# Patient Record
Sex: MALE | Race: WHITE | NOT HISPANIC OR LATINO | Employment: FULL TIME | URBAN - METROPOLITAN AREA
[De-identification: names, ages, dates, MRNs, and addresses within clinical notes are randomized per-mention and may not be internally consistent; named-entity substitution may affect disease eponyms.]

---

## 2020-01-04 ENCOUNTER — HOSPITAL ENCOUNTER (EMERGENCY)
Facility: HOSPITAL | Age: 45
Discharge: HOME | End: 2020-01-04
Attending: EMERGENCY MEDICINE
Payer: MEDICAID

## 2020-01-04 VITALS
HEIGHT: 74 IN | HEART RATE: 63 BPM | DIASTOLIC BLOOD PRESSURE: 84 MMHG | TEMPERATURE: 98 F | RESPIRATION RATE: 18 BRPM | OXYGEN SATURATION: 99 % | SYSTOLIC BLOOD PRESSURE: 132 MMHG

## 2020-01-04 DIAGNOSIS — R06.2 WHEEZE: ICD-10-CM

## 2020-01-04 DIAGNOSIS — Z20.828 EXPOSURE TO INFLUENZA: ICD-10-CM

## 2020-01-04 DIAGNOSIS — J06.9 UPPER RESPIRATORY TRACT INFECTION, UNSPECIFIED TYPE: Primary | ICD-10-CM

## 2020-01-04 PROCEDURE — 96372 THER/PROPH/DIAG INJ SC/IM: CPT | Mod: ER

## 2020-01-04 PROCEDURE — 99284 EMERGENCY DEPT VISIT MOD MDM: CPT | Mod: 25,ER

## 2020-01-04 PROCEDURE — 63600175 PHARM REV CODE 636 W HCPCS: Mod: ER | Performed by: NURSE PRACTITIONER

## 2020-01-04 PROCEDURE — 25000242 PHARM REV CODE 250 ALT 637 W/ HCPCS: Mod: ER | Performed by: NURSE PRACTITIONER

## 2020-01-04 PROCEDURE — 94640 AIRWAY INHALATION TREATMENT: CPT | Mod: ER

## 2020-01-04 RX ORDER — PREDNISONE 20 MG/1
40 TABLET ORAL DAILY
Qty: 8 TABLET | Refills: 0 | Status: SHIPPED | OUTPATIENT
Start: 2020-01-05 | End: 2020-01-09

## 2020-01-04 RX ORDER — METHYLPREDNISOLONE SOD SUCC 125 MG
125 VIAL (EA) INJECTION
Status: COMPLETED | OUTPATIENT
Start: 2020-01-04 | End: 2020-01-04

## 2020-01-04 RX ORDER — PROMETHAZINE HYDROCHLORIDE AND DEXTROMETHORPHAN HYDROBROMIDE 6.25; 15 MG/5ML; MG/5ML
5 SYRUP ORAL EVERY 6 HOURS PRN
Qty: 60 ML | Refills: 0 | Status: SHIPPED | OUTPATIENT
Start: 2020-01-04

## 2020-01-04 RX ORDER — IPRATROPIUM BROMIDE AND ALBUTEROL SULFATE 2.5; .5 MG/3ML; MG/3ML
3 SOLUTION RESPIRATORY (INHALATION)
Status: COMPLETED | OUTPATIENT
Start: 2020-01-04 | End: 2020-01-04

## 2020-01-04 RX ORDER — OSELTAMIVIR PHOSPHATE 75 MG/1
75 CAPSULE ORAL 2 TIMES DAILY
Qty: 10 CAPSULE | Refills: 0 | Status: SHIPPED | OUTPATIENT
Start: 2020-01-04 | End: 2020-01-09

## 2020-01-04 RX ADMIN — IPRATROPIUM BROMIDE AND ALBUTEROL SULFATE 3 ML: .5; 3 SOLUTION RESPIRATORY (INHALATION) at 09:01

## 2020-01-04 RX ADMIN — METHYLPREDNISOLONE SODIUM SUCCINATE 125 MG: 125 INJECTION, POWDER, FOR SOLUTION INTRAMUSCULAR; INTRAVENOUS at 09:01

## 2020-01-05 NOTE — ED PROVIDER NOTES
Encounter Date: 1/4/2020       History     Chief Complaint   Patient presents with    Generalized Body Aches     pt c/o body aches, cough and fever x 3 days, pt states wife and baby have been dx with flu     The history is provided by the patient. No  was used.   URI   The primary symptoms include fever, cough and myalgias. Primary symptoms do not include headaches, sore throat, wheezing, abdominal pain, nausea or vomiting. Primary symptoms comment: congestion. The current episode started today. This is a new problem. The fever began several days ago. The fever has been unchanged since its onset.   The cough is non-productive.   The myalgias are generalized. The myalgias are not associated with weakness.   Symptoms associated with the illness include sinus pressure and congestion. The illness is not associated with chills or rhinorrhea. The following treatments were addressed: Acetaminophen was not tried. A decongestant was not tried. Aspirin was not tried. NSAIDs were not tried.     Review of patient's allergies indicates:   Allergen Reactions    Dilantin [phenytoin sodium extended]      History reviewed. No pertinent past medical history.  History reviewed. No pertinent surgical history.  History reviewed. No pertinent family history.  Social History     Tobacco Use    Smoking status: Never Smoker   Substance Use Topics    Alcohol use: Never     Frequency: Never    Drug use: Never     Review of Systems   Constitutional: Positive for fever. Negative for activity change, chills and diaphoresis.   HENT: Positive for congestion, postnasal drip and sinus pressure. Negative for nosebleeds, rhinorrhea, sinus pain, sore throat and trouble swallowing.    Eyes: Negative.  Negative for pain, discharge, redness and itching.   Respiratory: Positive for cough. Negative for chest tightness, shortness of breath, wheezing and stridor.    Cardiovascular: Negative.  Negative for chest pain, palpitations and  leg swelling.   Gastrointestinal: Negative.  Negative for abdominal pain, constipation, diarrhea, nausea and vomiting.   Endocrine: Negative.  Negative for cold intolerance, heat intolerance, polydipsia, polyphagia and polyuria.   Genitourinary: Negative.  Negative for difficulty urinating, discharge, dysuria, frequency, penile pain, scrotal swelling and testicular pain.   Musculoskeletal: Positive for myalgias. Negative for back pain, joint swelling and neck pain.   Skin: Negative.  Negative for color change and wound.   Allergic/Immunologic: Negative.    Neurological: Negative.  Negative for dizziness, tremors, seizures, weakness, light-headedness and headaches.   Hematological: Negative.    Psychiatric/Behavioral: Negative.  Negative for agitation, confusion, hallucinations and suicidal ideas. The patient is not nervous/anxious.    All other systems reviewed and are negative.      Physical Exam     Initial Vitals [01/04/20 2051]   BP Pulse Resp Temp SpO2   139/86 66 18 98.4 °F (36.9 °C) 98 %      MAP       --         Physical Exam    Nursing note and vitals reviewed.  Constitutional: He appears well-developed and well-nourished. He is not diaphoretic.  Non-toxic appearance. He does not appear ill. No distress.   HENT:   Head: Normocephalic and atraumatic.   Nose: Mucosal edema and rhinorrhea present.   Mouth/Throat: Uvula is midline, oropharynx is clear and moist and mucous membranes are normal. No uvula swelling. No oropharyngeal exudate, posterior oropharyngeal edema or posterior oropharyngeal erythema.   Eyes: Conjunctivae are normal.   Neck: Normal range of motion.   Cardiovascular: Normal rate, regular rhythm, normal heart sounds and intact distal pulses. Exam reveals no gallop and no friction rub.    No murmur heard.  Pulmonary/Chest: Breath sounds normal. No respiratory distress. He has no wheezes. He has no rhonchi. He has no rales. He exhibits no tenderness.   Musculoskeletal: Normal range of motion.    Neurological: He is alert and oriented to person, place, and time.   Skin: Skin is warm and dry. Capillary refill takes less than 2 seconds. No rash noted.   Psychiatric: He has a normal mood and affect. His behavior is normal. Judgment and thought content normal.         ED Course   Procedures  Labs Reviewed - No data to display       Imaging Results          X-Ray Chest PA And Lateral (Final result)  Result time 01/04/20 22:00:01    Final result by Faye Novak MD (01/04/20 22:00:01)                 Impression:      No acute cardiopulmonary process identified.      Electronically signed by: Faye Novak MD  Date:    01/04/2020  Time:    22:00             Narrative:    EXAMINATION:  XR CHEST PA AND LATERAL    CLINICAL HISTORY:  Wheezing    TECHNIQUE:  PA and lateral views of the chest were performed.    COMPARISON:  None    FINDINGS:  Cardiac silhouette is normal in size.  Lungs are symmetrically expanded.  No evidence of focal consolidative process, pneumothorax, or significant effusion.  No acute osseous abnormality identified.                                 Medical Decision Making:   Initial Assessment:   Influenza exposure  Differential Diagnosis:   URI  Clinical Tests:   Radiological Study: Reviewed and Ordered  ED Management:  DuoNeb via inhalation given in the ER.  Solu-Medrol IM given in the ER.    1) Pt will be discharged home on prednisone, promethazine DM and Tamiflu  2) Pt instructed to drink plenty of fluids to loosen secretions  3) Pt instructed that he may take over-the-counter Mucinex (NOT DM) as needed  4) Pt instructed to take over-the-counter Tylenol or Motrin for fever/body aches   5) Pt instructed to return to ER as needed if symptoms worsen/fail to improve  6) Pt instructed to follow-up with primary care provider in 2 days   7) Pt verbalized understanding of discharge instructions and treatment plan                                 Clinical Impression:       ICD-10-CM ICD-9-CM   1.  Upper respiratory tract infection, unspecified type J06.9 465.9   2. Wheeze R06.2 786.07   3. Exposure to influenza Z20.828 V01.79                             Toussaint Mariamy III, [facility]  01/04/20 2218

## 2021-04-15 ENCOUNTER — PATIENT MESSAGE (OUTPATIENT)
Dept: RESEARCH | Facility: HOSPITAL | Age: 46
End: 2021-04-15

## 2023-08-16 ENCOUNTER — HOSPITAL ENCOUNTER (EMERGENCY)
Facility: HOSPITAL | Age: 48
Discharge: HOME | End: 2023-08-16
Attending: EMERGENCY MEDICINE
Payer: MEDICAID

## 2023-08-16 VITALS
DIASTOLIC BLOOD PRESSURE: 81 MMHG | WEIGHT: 222 LBS | SYSTOLIC BLOOD PRESSURE: 136 MMHG | HEART RATE: 58 BPM | BODY MASS INDEX: 28.49 KG/M2 | OXYGEN SATURATION: 97 % | TEMPERATURE: 98 F | RESPIRATION RATE: 18 BRPM | HEIGHT: 74 IN

## 2023-08-16 DIAGNOSIS — M79.671 FOOT PAIN, RIGHT: Primary | ICD-10-CM

## 2023-08-16 PROCEDURE — 99283 EMERGENCY DEPT VISIT LOW MDM: CPT

## 2023-08-16 RX ORDER — KETOROLAC TROMETHAMINE 30 MG/ML
30 INJECTION, SOLUTION INTRAMUSCULAR; INTRAVENOUS
Status: DISCONTINUED | OUTPATIENT
Start: 2023-08-16 | End: 2023-08-16

## 2023-08-16 RX ORDER — CLONIDINE HYDROCHLORIDE 0.1 MG/1
0.1 TABLET ORAL
Status: DISCONTINUED | OUTPATIENT
Start: 2023-08-16 | End: 2023-08-16

## 2023-08-17 NOTE — ED TRIAGE NOTES
"47 yo male presents to the ED with c/o right foot pain. Pt reports that he was "playing basketball" about 0700 when he "felt a pop" in his right foot. Pt explains that he has pain when placing pressure on the foot but denies any pain when seated. Pt denies any other injuries/symptoms. Pt is AAO x 3 upon assessment; no distress noted at this time. Plan of care is ongoing.  "

## 2023-08-17 NOTE — ED PROVIDER NOTES
"Encounter Date: 8/16/2023       History     Chief Complaint   Patient presents with    Foot Injury     Pt presents to the ED with c/o pain to right foot x30 minutes. States he was playing basketball and felt a "pop" under right heel. Hx of calf strain with similar symptoms. States pain is worst with plantar flexion. Denies OTC meds after injury. Took aleve prior to game at about 1700     49yo M presents to ED with chief complaint R heel pain.    Pt was playing basketball this afternoon; states he was passed the ball, planted his R foot to run, felt a "pop" to the plantar heel with associated pain.  He was able to play the remainder of the game with antalgic gait.  Pain is worsened with weight-bearing and palpation.  Does admit to remote history of plantar fascia issues.  Denies any pain to the Achilles or history of Achilles injury.  No foot swelling.  No erythema or warmth.  No open wound.  No puncture wound.  No leg swelling.    Hx gastric sleeve      Review of patient's allergies indicates:   Allergen Reactions    Dilantin [phenytoin sodium extended]      No past medical history on file.  No past surgical history on file.  No family history on file.  Social History     Tobacco Use    Smoking status: Never   Substance Use Topics    Alcohol use: Never    Drug use: Never     Review of Systems   Constitutional:  Negative for fever.   Musculoskeletal:  Positive for arthralgias and gait problem. Negative for joint swelling.   Skin:  Negative for color change and wound.   Neurological:  Negative for weakness.       Physical Exam     Initial Vitals [08/16/23 2005]   BP Pulse Resp Temp SpO2   120/74 79 16 98.7 °F (37.1 °C) 100 %      MAP       --         Physical Exam    Nursing note and vitals reviewed.  Constitutional: He appears well-developed and well-nourished. He is not diaphoretic. No distress.   HENT:   Head: Normocephalic and atraumatic.   Neck: Neck supple.   Normal range of motion.  Cardiovascular:  Intact " distal pulses.           1+ DP   Musculoskeletal:      Cervical back: Normal range of motion and neck supple.      Comments: R foot: ttp medial aspect plantar heel. No bony deformity. No open wound. Mild pain with passive MTP extension. No achilles ttp or palpable defect. No pain with passive/active ankle ROM.  No joint swelling or effusion.  No bony abnormality.     Neurological: He is alert and oriented to person, place, and time. GCS score is 15. GCS eye subscore is 4. GCS verbal subscore is 5. GCS motor subscore is 6.   Skin: Skin is warm. Capillary refill takes less than 2 seconds.   Psychiatric: He has a normal mood and affect. Thought content normal.         ED Course   Procedures  Labs Reviewed - No data to display       Imaging Results              X-Ray Foot Complete Right (Final result)  Result time 08/16/23 20:54:35      Final result by Marco Griffin MD (08/16/23 20:54:35)                   Impression:      No acute process.      Electronically signed by: Marco Griffin MD  Date:    08/16/2023  Time:    20:54               Narrative:    EXAMINATION:  XR FOOT COMPLETE 3 VIEW RIGHT    CLINICAL HISTORY:  Pain in right foot    TECHNIQUE:  AP, lateral, and oblique views of the right foot were performed.    COMPARISON:  None    FINDINGS:  The bone mineralization is within normal limits.  There is no cortical step-off.  There is no evidence of periostitis.    There is an enthesophyte at the insertion of the Achilles tendon.  There is a plantar calcaneal spur present.  The joint spaces are maintained.  The Lisfranc articulation is within normal limits.    The soft tissues are unremarkable.  No radiopaque foreign body is identified.    There is no evidence of a fracture or dislocation.                                       Medications - No data to display  Medical Decision Making:   Differential Diagnosis:   Plantar fascia strain, contusion, arthritis  Clinical Tests:   Radiological Study: Ordered and Reviewed  ED  Management:  No convincing evidence of Achilles injury.  No obvious bony fracture on imaging.  No trauma to the area.  Admits to history of previous plantar fascia issues.  Suspect plantar fascia strain.  Referral to Podiatry and orthopedics placed.  Discussed RICE, NSAIDs p.r.n., outpatient follow-up.  Patient comfortable plan.                          Clinical Impression:   Final diagnoses:  [M79.261] Foot pain, right (Primary)        ED Disposition Condition    Discharge Stable          ED Prescriptions    None       Follow-up Information       Follow up With Specialties Details Why Contact Info    Suzette Joe DPM Podiatry, Wound Care Schedule an appointment as soon as possible for a visit  For [facility]evaluation by Podiatry [address] [facility]  [place] [address] [phone]      Chrissie Bullard DPM Podiatry, Wound Care Schedule an appointment as soon as possible for a visit  For reevaluation by Podiatry [address]  [place] [address] [phone]      Everette Mcdowell MD Orthopedic Surgery Schedule an appointment as soon as possible for a visit  to schedule an appointment for followup with Orthopedics, For reevaluation [address] [place] [id number]  [phone]      [facility]  Schedule an appointment as soon as possible for a visit  For reevaluation by Orthopedics [address] [place] [address] [phone]             Channing Manning, STEPAN  08/17/23 0252

## 2023-08-17 NOTE — DISCHARGE INSTRUCTIONS
Rest the area, avoid movements which worsen your pain, ibuprofen and/or similar NSAIDs as needed for discomfort.  Ice, elevation to help with any swelling and discomfort.  Weight-bearing as tolerated.    Follow-up with a local podiatrist or orthopedic physician for re-evaluation should you continue with pain.    Return to this ED if you experience worsening pain and swelling, if unable to walk or bear weight, if any other problems occur.